# Patient Record
Sex: FEMALE | Race: WHITE
[De-identification: names, ages, dates, MRNs, and addresses within clinical notes are randomized per-mention and may not be internally consistent; named-entity substitution may affect disease eponyms.]

---

## 2023-04-27 ENCOUNTER — APPOINTMENT (OUTPATIENT)
Dept: OTOLARYNGOLOGY | Facility: CLINIC | Age: 23
End: 2023-04-27
Payer: COMMERCIAL

## 2023-04-27 VITALS
OXYGEN SATURATION: 99 % | HEART RATE: 88 BPM | RESPIRATION RATE: 16 BRPM | BODY MASS INDEX: 25.39 KG/M2 | DIASTOLIC BLOOD PRESSURE: 76 MMHG | SYSTOLIC BLOOD PRESSURE: 113 MMHG | WEIGHT: 158 LBS | TEMPERATURE: 97.8 F | HEIGHT: 66 IN

## 2023-04-27 PROBLEM — Z00.00 ENCOUNTER FOR PREVENTIVE HEALTH EXAMINATION: Status: ACTIVE | Noted: 2023-04-27

## 2023-04-27 PROCEDURE — 92557 COMPREHENSIVE HEARING TEST: CPT

## 2023-04-27 PROCEDURE — 99202 OFFICE O/P NEW SF 15 MIN: CPT | Mod: 25

## 2023-04-27 PROCEDURE — 92550 TYMPANOMETRY & REFLEX THRESH: CPT | Mod: 52

## 2023-04-27 PROCEDURE — 31231 NASAL ENDOSCOPY DX: CPT | Mod: 52

## 2023-04-27 NOTE — REASON FOR VISIT
[Initial Evaluation] : an initial evaluation for [FreeTextEntry2] : History of Otitis Media as a child.  Left ear Tympanic membrane perforation since childhood. Patient states her level of severity is a level 5 out of 10 and it occurs constant.  Patient states nothing helps to improve or worsens her History of Otitis Media as a child.  Left ear Tympanic membrane perforation since childhood.

## 2023-04-27 NOTE — PHYSICAL EXAM
[de-identified] : Rt tympanosclerosis and Lt TM perf small  [] : septum deviated to the left [de-identified] : stone FESS [Normal] : mucosa is normal [Midline] : trachea located in midline position

## 2023-04-27 NOTE — HISTORY OF PRESENT ILLNESS
[de-identified] : 22 years old female patient with history of History of Otitis Media as a child.  Left ear Tympanic membrane perforation since childhood.   Patient is present today in the office with persistent Left tympanic membrane perforation

## 2023-06-22 ENCOUNTER — APPOINTMENT (OUTPATIENT)
Dept: OTOLARYNGOLOGY | Facility: CLINIC | Age: 23
End: 2023-06-22
Payer: COMMERCIAL

## 2023-06-22 PROCEDURE — 99213 OFFICE O/P EST LOW 20 MIN: CPT

## 2023-06-22 NOTE — PHYSICAL EXAM
[] : septum deviated to the left [Midline] : trachea located in midline position [Normal] : no rashes [de-identified] : Rt tympanosclerosis and Lt TM perf small  [de-identified] : stone FESS

## 2023-06-22 NOTE — HISTORY OF PRESENT ILLNESS
[de-identified] : 22 years old female patient with history of History of Otitis Media as a child.  Left ear Tympanic membrane perforation since childhood.   Patient is present today in the office with persistent Left tympanic membrane perforation. Resolving Otitis Media

## 2023-06-22 NOTE — REASON FOR VISIT
[Subsequent Evaluation] : a subsequent evaluation for [Complete Audio Eval] : Complete Audio Evaluation [FreeTextEntry2] : History of Otitis Media as a child.  Left ear Tympanic membrane perforation since childhood. [FreeTextEntry1] : See scanned audiogram completed on 04/27/2023. \par \par Audio: Hearing WNL AD and mild CHL rising to WNL sloping back to mild CHL AS\par Tymp: Type A tymp AD and type B tymp AS

## 2023-07-20 ENCOUNTER — APPOINTMENT (OUTPATIENT)
Dept: OTOLARYNGOLOGY | Facility: CLINIC | Age: 23
End: 2023-07-20
Payer: COMMERCIAL

## 2023-07-20 VITALS
DIASTOLIC BLOOD PRESSURE: 84 MMHG | HEIGHT: 66 IN | WEIGHT: 158 LBS | BODY MASS INDEX: 25.39 KG/M2 | SYSTOLIC BLOOD PRESSURE: 120 MMHG | OXYGEN SATURATION: 99 % | HEART RATE: 74 BPM | TEMPERATURE: 98 F

## 2023-07-20 PROCEDURE — 69620 MYRINGOPLASTY: CPT | Mod: LT

## 2023-07-20 NOTE — PROCEDURE
[Other ___] : [unfilled] [] : Binocular Microscopy [FreeTextEntry6] : sp FESS\par DNS [FreeTextEntry5] : Glover suction #5, Ear curette, Ear Alligator

## 2023-07-20 NOTE — HISTORY OF PRESENT ILLNESS
[de-identified] : 22 years old female patient with history of History of Otitis Media as a child.  Left ear Tympanic membrane perforation since childhood.   Patient is present today in the office with persistent Left tympanic membrane perforation. Left Myringoplasty

## 2023-07-20 NOTE — PHYSICAL EXAM
[] : septum deviated to the left [Normal] : no rashes [de-identified] : Rt tympanosclerosis and Lt TM perf small  [de-identified] : stone FESS

## 2023-08-02 ENCOUNTER — APPOINTMENT (OUTPATIENT)
Dept: OTOLARYNGOLOGY | Facility: CLINIC | Age: 23
End: 2023-08-02
Payer: COMMERCIAL

## 2023-08-02 PROCEDURE — 99213 OFFICE O/P EST LOW 20 MIN: CPT | Mod: 24

## 2023-08-02 NOTE — PHYSICAL EXAM
[] : septum deviated to the left [Normal] : no rashes [de-identified] : Rt tympanosclerosis and Lt TM perf small  [de-identified] : stone FESS

## 2023-08-02 NOTE — PROCEDURE
[Other ___] : [unfilled] [] : Binocular Microscopy [FreeTextEntry6] : sp FESS\par  DNS [FreeTextEntry5] : Glover suction #5, Ear curette, Ear Alligator.  Left ear debridement.

## 2023-08-02 NOTE — HISTORY OF PRESENT ILLNESS
[de-identified] : 22 years old female patient with history of History of Otitis Media as a child.  Left ear Tympanic membrane perforation since childhood.   Patient is present today in the office with persistent Left tympanic membrane perforation. Left Myringoplasty.  Left ear debridement.

## 2023-08-23 ENCOUNTER — APPOINTMENT (OUTPATIENT)
Dept: OTOLARYNGOLOGY | Facility: CLINIC | Age: 23
End: 2023-08-23
Payer: COMMERCIAL

## 2023-08-23 VITALS
HEART RATE: 74 BPM | TEMPERATURE: 98 F | OXYGEN SATURATION: 98 % | WEIGHT: 165 LBS | BODY MASS INDEX: 26.52 KG/M2 | HEIGHT: 66 IN | SYSTOLIC BLOOD PRESSURE: 132 MMHG | DIASTOLIC BLOOD PRESSURE: 86 MMHG

## 2023-08-23 DIAGNOSIS — H91.92 UNSPECIFIED HEARING LOSS, LEFT EAR: ICD-10-CM

## 2023-08-23 DIAGNOSIS — H92.01 OTALGIA, RIGHT EAR: ICD-10-CM

## 2023-08-23 DIAGNOSIS — H72.92 UNSPECIFIED PERFORATION OF TYMPANIC MEMBRANE, LEFT EAR: ICD-10-CM

## 2023-08-23 PROCEDURE — 99213 OFFICE O/P EST LOW 20 MIN: CPT | Mod: 24

## 2023-08-23 NOTE — PHYSICAL EXAM
[] : septum deviated to the left [Normal] : no rashes [de-identified] : Rt tympanosclerosis and Lt TM perf small  [de-identified] : stone FESS

## 2023-08-23 NOTE — HISTORY OF PRESENT ILLNESS
[de-identified] : 22 years old female patient with history of History of Otitis Media as a child.  Left ear Tympanic membrane perforation since childhood.   Patient is present today in the office with persistent Left tympanic membrane perforation. Status post Left Myringoplasty.  Left ear debridement. Patient is healing according to plan.